# Patient Record
Sex: MALE | Race: BLACK OR AFRICAN AMERICAN | NOT HISPANIC OR LATINO
[De-identification: names, ages, dates, MRNs, and addresses within clinical notes are randomized per-mention and may not be internally consistent; named-entity substitution may affect disease eponyms.]

---

## 2017-09-21 ENCOUNTER — APPOINTMENT (OUTPATIENT)
Dept: UROLOGY | Facility: CLINIC | Age: 82
End: 2017-09-21

## 2017-10-04 ENCOUNTER — APPOINTMENT (OUTPATIENT)
Dept: UROLOGY | Facility: CLINIC | Age: 82
End: 2017-10-04
Payer: MEDICARE

## 2017-10-04 VITALS
WEIGHT: 140 LBS | OXYGEN SATURATION: 98 % | BODY MASS INDEX: 21.22 KG/M2 | HEIGHT: 68 IN | SYSTOLIC BLOOD PRESSURE: 120 MMHG | HEART RATE: 57 BPM | DIASTOLIC BLOOD PRESSURE: 62 MMHG

## 2017-10-04 DIAGNOSIS — Z80.42 FAMILY HISTORY OF MALIGNANT NEOPLASM OF PROSTATE: ICD-10-CM

## 2017-10-04 DIAGNOSIS — Z87.39 PERSONAL HISTORY OF OTHER DISEASES OF THE MUSCULOSKELETAL SYSTEM AND CONNECTIVE TISSUE: ICD-10-CM

## 2017-10-04 DIAGNOSIS — Z63.5 DISRUPTION OF FAMILY BY SEPARATION AND DIVORCE: ICD-10-CM

## 2017-10-04 DIAGNOSIS — Z87.891 PERSONAL HISTORY OF NICOTINE DEPENDENCE: ICD-10-CM

## 2017-10-04 DIAGNOSIS — M25.569 PAIN IN UNSPECIFIED KNEE: ICD-10-CM

## 2017-10-04 DIAGNOSIS — Z78.9 OTHER SPECIFIED HEALTH STATUS: ICD-10-CM

## 2017-10-04 PROCEDURE — 99204 OFFICE O/P NEW MOD 45 MIN: CPT

## 2017-10-04 RX ORDER — ECONAZOLE NITRATE 10 MG/G
CREAM TOPICAL
Refills: 0 | Status: ACTIVE | COMMUNITY

## 2017-10-04 RX ORDER — CICLOPIROX 80 MG/ML
SOLUTION TOPICAL
Refills: 0 | Status: ACTIVE | COMMUNITY

## 2017-10-04 RX ORDER — DICLOFENAC POTASSIUM 50 MG/1
TABLET, COATED ORAL
Refills: 0 | Status: ACTIVE | COMMUNITY

## 2017-10-04 RX ORDER — ESOMEPRAZOLE MAGNESIUM 40 MG/1
40 CAPSULE, DELAYED RELEASE ORAL
Refills: 0 | Status: ACTIVE | COMMUNITY

## 2017-10-04 SDOH — SOCIAL STABILITY - SOCIAL INSECURITY: DISRUPTION OF FAMILY BY SEPARATION AND DIVORCE: Z63.5

## 2017-10-05 LAB
APPEARANCE: ABNORMAL
BACTERIA: ABNORMAL
BILIRUBIN URINE: NEGATIVE
BLOOD URINE: ABNORMAL
COLOR: YELLOW
GLUCOSE QUALITATIVE U: NEGATIVE MG/DL
HYALINE CASTS: 4 /LPF
KETONES URINE: NEGATIVE
LEUKOCYTE ESTERASE URINE: ABNORMAL
MICROSCOPIC-UA: NORMAL
NITRITE URINE: POSITIVE
PH URINE: 5.5
PROTEIN URINE: NEGATIVE MG/DL
RED BLOOD CELLS URINE: 4 /HPF
SPECIFIC GRAVITY URINE: 1.02
SQUAMOUS EPITHELIAL CELLS: 14 /HPF
UROBILINOGEN URINE: NEGATIVE MG/DL
WHITE BLOOD CELLS URINE: 555 /HPF

## 2017-10-09 ENCOUNTER — OUTPATIENT (OUTPATIENT)
Dept: OUTPATIENT SERVICES | Facility: HOSPITAL | Age: 82
LOS: 1 days | End: 2017-10-09
Payer: MEDICARE

## 2017-10-09 ENCOUNTER — APPOINTMENT (OUTPATIENT)
Dept: MRI IMAGING | Facility: CLINIC | Age: 82
End: 2017-10-09
Payer: MEDICARE

## 2017-10-09 ENCOUNTER — MEDICATION RENEWAL (OUTPATIENT)
Age: 82
End: 2017-10-09

## 2017-10-09 ENCOUNTER — APPOINTMENT (OUTPATIENT)
Dept: CT IMAGING | Facility: CLINIC | Age: 82
End: 2017-10-09
Payer: MEDICARE

## 2017-10-09 DIAGNOSIS — Z00.8 ENCOUNTER FOR OTHER GENERAL EXAMINATION: ICD-10-CM

## 2017-10-09 LAB — BACTERIA UR CULT: ABNORMAL

## 2017-10-09 PROCEDURE — 72141 MRI NECK SPINE W/O DYE: CPT

## 2017-10-09 PROCEDURE — 74176 CT ABD & PELVIS W/O CONTRAST: CPT | Mod: 26

## 2017-10-09 PROCEDURE — 72141 MRI NECK SPINE W/O DYE: CPT | Mod: 26

## 2017-10-09 PROCEDURE — 74176 CT ABD & PELVIS W/O CONTRAST: CPT

## 2017-10-16 ENCOUNTER — OUTPATIENT (OUTPATIENT)
Dept: OUTPATIENT SERVICES | Facility: HOSPITAL | Age: 82
LOS: 1 days | End: 2017-10-16
Payer: MEDICARE

## 2017-10-16 ENCOUNTER — APPOINTMENT (OUTPATIENT)
Dept: MRI IMAGING | Facility: CLINIC | Age: 82
End: 2017-10-16
Payer: MEDICARE

## 2017-10-16 DIAGNOSIS — Z00.8 ENCOUNTER FOR OTHER GENERAL EXAMINATION: ICD-10-CM

## 2017-10-16 DIAGNOSIS — M54.12 RADICULOPATHY, CERVICAL REGION: ICD-10-CM

## 2017-10-16 PROCEDURE — 72156 MRI NECK SPINE W/O & W/DYE: CPT | Mod: 26

## 2017-10-16 PROCEDURE — 72156 MRI NECK SPINE W/O & W/DYE: CPT

## 2017-10-16 PROCEDURE — 72141 MRI NECK SPINE W/O DYE: CPT

## 2017-10-20 DIAGNOSIS — M50.322 OTHER CERVICAL DISC DEGENERATION AT C5-C6 LEVEL: ICD-10-CM

## 2017-10-20 DIAGNOSIS — M50.222 OTHER CERVICAL DISC DISPLACEMENT AT C5-C6 LEVEL: ICD-10-CM

## 2017-10-20 DIAGNOSIS — N28.1 CYST OF KIDNEY, ACQUIRED: ICD-10-CM

## 2017-10-20 DIAGNOSIS — M47.812 SPONDYLOSIS WITHOUT MYELOPATHY OR RADICULOPATHY, CERVICAL REGION: ICD-10-CM

## 2017-10-23 ENCOUNTER — APPOINTMENT (OUTPATIENT)
Dept: CT IMAGING | Facility: CLINIC | Age: 82
End: 2017-10-23
Payer: MEDICARE

## 2017-10-23 ENCOUNTER — OUTPATIENT (OUTPATIENT)
Dept: OUTPATIENT SERVICES | Facility: HOSPITAL | Age: 82
LOS: 1 days | End: 2017-10-23
Payer: MEDICARE

## 2017-10-23 DIAGNOSIS — R63.4 ABNORMAL WEIGHT LOSS: ICD-10-CM

## 2017-10-23 PROCEDURE — 71250 CT THORAX DX C-: CPT | Mod: 26

## 2017-10-23 PROCEDURE — 71250 CT THORAX DX C-: CPT

## 2017-11-13 ENCOUNTER — OUTPATIENT (OUTPATIENT)
Dept: OUTPATIENT SERVICES | Facility: HOSPITAL | Age: 82
LOS: 1 days | End: 2017-11-13
Payer: MEDICARE

## 2017-11-13 ENCOUNTER — APPOINTMENT (OUTPATIENT)
Dept: MRI IMAGING | Facility: CLINIC | Age: 82
End: 2017-11-13
Payer: MEDICARE

## 2017-11-13 DIAGNOSIS — Z00.8 ENCOUNTER FOR OTHER GENERAL EXAMINATION: ICD-10-CM

## 2017-11-13 PROCEDURE — 72148 MRI LUMBAR SPINE W/O DYE: CPT | Mod: 26

## 2017-11-13 PROCEDURE — 72148 MRI LUMBAR SPINE W/O DYE: CPT

## 2018-01-08 ENCOUNTER — APPOINTMENT (OUTPATIENT)
Dept: UROLOGY | Facility: CLINIC | Age: 83
End: 2018-01-08

## 2018-01-22 ENCOUNTER — RX RENEWAL (OUTPATIENT)
Age: 83
End: 2018-01-22

## 2018-03-22 ENCOUNTER — OUTPATIENT (OUTPATIENT)
Dept: OUTPATIENT SERVICES | Facility: HOSPITAL | Age: 83
LOS: 1 days | End: 2018-03-22
Payer: MEDICARE

## 2018-03-22 ENCOUNTER — APPOINTMENT (OUTPATIENT)
Dept: CT IMAGING | Facility: CLINIC | Age: 83
End: 2018-03-22
Payer: MEDICARE
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
## 2018-03-22 PROCEDURE — 70450 CT HEAD/BRAIN W/O DYE: CPT

## 2018-03-22 PROCEDURE — 70450 CT HEAD/BRAIN W/O DYE: CPT | Mod: 26

## 2018-03-26 ENCOUNTER — APPOINTMENT (OUTPATIENT)
Dept: NUCLEAR MEDICINE | Facility: IMAGING CENTER | Age: 83
End: 2018-03-26
Payer: MEDICARE

## 2018-03-26 ENCOUNTER — OUTPATIENT (OUTPATIENT)
Dept: OUTPATIENT SERVICES | Facility: HOSPITAL | Age: 83
LOS: 1 days | End: 2018-03-26
Payer: MEDICARE

## 2018-03-26 DIAGNOSIS — R93.8 ABNORMAL FINDINGS ON DIAGNOSTIC IMAGING OF OTHER SPECIFIED BODY STRUCTURES: ICD-10-CM

## 2018-03-26 DIAGNOSIS — M89.9 DISORDER OF BONE, UNSPECIFIED: ICD-10-CM

## 2018-03-26 PROCEDURE — 78999 UNLISTED MISC PX DX NUC MED: CPT

## 2018-03-26 PROCEDURE — 78306 BONE IMAGING WHOLE BODY: CPT

## 2018-03-26 PROCEDURE — 78306 BONE IMAGING WHOLE BODY: CPT | Mod: 26

## 2018-03-26 PROCEDURE — A9561: CPT

## 2018-03-26 PROCEDURE — 78320: CPT | Mod: 26

## 2018-04-23 ENCOUNTER — RX RENEWAL (OUTPATIENT)
Age: 83
End: 2018-04-23

## 2018-05-16 ENCOUNTER — APPOINTMENT (OUTPATIENT)
Dept: UROLOGY | Facility: CLINIC | Age: 83
End: 2018-05-16
Payer: MEDICARE

## 2018-05-16 VITALS
HEIGHT: 68 IN | TEMPERATURE: 97.9 F | SYSTOLIC BLOOD PRESSURE: 126 MMHG | OXYGEN SATURATION: 98 % | HEART RATE: 54 BPM | DIASTOLIC BLOOD PRESSURE: 64 MMHG

## 2018-05-16 DIAGNOSIS — Z86.79 PERSONAL HISTORY OF OTHER DISEASES OF THE CIRCULATORY SYSTEM: ICD-10-CM

## 2018-05-16 DIAGNOSIS — Z86.39 PERSONAL HISTORY OF OTHER ENDOCRINE, NUTRITIONAL AND METABOLIC DISEASE: ICD-10-CM

## 2018-05-16 PROCEDURE — 99214 OFFICE O/P EST MOD 30 MIN: CPT

## 2018-05-16 RX ORDER — LOSARTAN POTASSIUM 100 MG/1
100 TABLET, FILM COATED ORAL
Qty: 90 | Refills: 0 | Status: ACTIVE | COMMUNITY
Start: 2018-01-30

## 2018-05-16 RX ORDER — FLUTICASONE FUROATE AND VILANTEROL TRIFENATATE 200; 25 UG/1; UG/1
200-25 POWDER RESPIRATORY (INHALATION)
Qty: 60 | Refills: 0 | Status: DISCONTINUED | COMMUNITY
Start: 2018-01-23 | End: 2018-05-16

## 2018-05-16 RX ORDER — AMLODIPINE BESYLATE 5 MG/1
5 TABLET ORAL
Refills: 0 | Status: DISCONTINUED | COMMUNITY
End: 2018-05-16

## 2018-05-16 RX ORDER — FLUTICASONE FUROATE AND VILANTEROL TRIFENATATE 100; 25 UG/1; UG/1
100-25 POWDER RESPIRATORY (INHALATION)
Qty: 60 | Refills: 0 | Status: ACTIVE | COMMUNITY
Start: 2018-03-22

## 2018-05-16 RX ORDER — AMLODIPINE AND ATORVASTATIN 5; 10 MG/1; MG/1
5-10 TABLET, COATED ORAL
Qty: 90 | Refills: 0 | Status: ACTIVE | COMMUNITY
Start: 2018-03-07

## 2018-05-16 RX ORDER — UMECLIDINIUM 62.5 UG/1
62.5 AEROSOL, POWDER ORAL
Qty: 30 | Refills: 0 | Status: ACTIVE | COMMUNITY
Start: 2018-01-23

## 2018-05-18 LAB — BACTERIA UR CULT: ABNORMAL

## 2018-07-22 PROBLEM — Z78.9 ALCOHOL USE: Status: INACTIVE | Noted: 2017-10-04

## 2018-08-21 ENCOUNTER — RX RENEWAL (OUTPATIENT)
Age: 83
End: 2018-08-21

## 2018-11-19 ENCOUNTER — APPOINTMENT (OUTPATIENT)
Dept: UROLOGY | Facility: CLINIC | Age: 83
End: 2018-11-19
Payer: MEDICARE

## 2018-11-19 VITALS
WEIGHT: 140 LBS | HEIGHT: 68 IN | TEMPERATURE: 97.9 F | DIASTOLIC BLOOD PRESSURE: 72 MMHG | SYSTOLIC BLOOD PRESSURE: 112 MMHG | BODY MASS INDEX: 21.22 KG/M2 | OXYGEN SATURATION: 96 % | RESPIRATION RATE: 16 BRPM | HEART RATE: 79 BPM

## 2018-11-19 PROCEDURE — 99214 OFFICE O/P EST MOD 30 MIN: CPT

## 2018-11-19 RX ORDER — SOLIFENACIN SUCCINATE 5 MG/1
5 TABLET, FILM COATED ORAL DAILY
Qty: 30 | Refills: 6 | Status: DISCONTINUED | COMMUNITY
Start: 2018-05-16 | End: 2018-11-19

## 2018-11-23 LAB — BACTERIA UR CULT: ABNORMAL

## 2019-05-29 ENCOUNTER — APPOINTMENT (OUTPATIENT)
Dept: UROLOGY | Facility: CLINIC | Age: 84
End: 2019-05-29
Payer: MEDICARE

## 2019-05-29 VITALS
BODY MASS INDEX: 21.22 KG/M2 | WEIGHT: 140 LBS | RESPIRATION RATE: 16 BRPM | HEIGHT: 68 IN | SYSTOLIC BLOOD PRESSURE: 120 MMHG | DIASTOLIC BLOOD PRESSURE: 74 MMHG | HEART RATE: 84 BPM | OXYGEN SATURATION: 97 %

## 2019-05-29 PROCEDURE — 99213 OFFICE O/P EST LOW 20 MIN: CPT

## 2019-05-29 RX ORDER — BLOOD SUGAR DIAGNOSTIC
STRIP MISCELLANEOUS
Qty: 100 | Refills: 0 | Status: ACTIVE | COMMUNITY
Start: 2019-05-23

## 2019-05-29 RX ORDER — FLUCONAZOLE 100 MG/1
100 TABLET ORAL
Qty: 3 | Refills: 0 | Status: ACTIVE | COMMUNITY
Start: 2019-04-18

## 2019-05-29 RX ORDER — TOLTERODINE TARTRATE 4 MG/1
4 CAPSULE, EXTENDED RELEASE ORAL
Qty: 90 | Refills: 2 | Status: DISCONTINUED | COMMUNITY
Start: 2018-11-19 | End: 2019-05-29

## 2019-05-29 RX ORDER — OMEPRAZOLE 20 MG/1
20 CAPSULE, DELAYED RELEASE ORAL
Qty: 30 | Refills: 0 | Status: ACTIVE | COMMUNITY
Start: 2019-05-23

## 2019-05-29 RX ORDER — CHLORTHALIDONE 25 MG/1
25 TABLET ORAL
Qty: 90 | Refills: 0 | Status: ACTIVE | COMMUNITY
Start: 2018-12-03

## 2019-05-29 RX ORDER — LANCETS
EACH MISCELLANEOUS
Qty: 100 | Refills: 0 | Status: ACTIVE | COMMUNITY
Start: 2019-05-23

## 2019-05-29 NOTE — HISTORY OF PRESENT ILLNESS
[FreeTextEntry1] : He is an 83-year-old man who is seen today for persistent bacteriuria, urge incontinence and prostate cancer. Detrol LA did not change urgency. He is on Flomax and also prophylactic Macrobid. 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 In October 2018, PSA level was 0.5. He is to supposed to see primary care physician soon for repeat PSA level. Residual urine today was about 150 cc. There is no hematuria, or flank pain. \par Previous note: Patient states that his cardiologist gave him testosterone injections for low testosterone which he stopped now. He was treated with radiation therapy for prostate cancer about 10 years ago. Subsequently he had placement of a semirigid penile prosthesis. He has tried biofeedback and anticholinergic medications without success.

## 2019-05-29 NOTE — ASSESSMENT
[FreeTextEntry1] : Stop Detrol. Continue Flomax and prophylactic Macrobid. Urine culture will be repeated. He will continue to manage urinary symptoms and urge incontinence conservatively for now. He can followup in 6 months.\par \par Denzel Ramey MD, FACS\par The [facility]\par  of Urology\par \par [address], [address]\[place] [address]\par \par [address], [address]\[place] [address]\par \par Tel: [phone]\par Fax: [phone]

## 2019-05-29 NOTE — LETTER BODY
[Dear  ___] : Dear  [unfilled], [Consult Closing:] : Thank you very much for allowing me to participate in the care of this patient.  If you have any questions, please do not hesitate to contact me. [Consult Letter:] : I had the pleasure of evaluating your patient, [unfilled]. [FreeTextEntry1] : ACP\par [address] \[place], [place], [address]  \par [phone] \par

## 2019-05-29 NOTE — PHYSICAL EXAM
[General Appearance - Well Nourished] : well nourished [General Appearance - Well Developed] : well developed [Well Groomed] : well groomed [Normal Appearance] : normal appearance [General Appearance - In No Acute Distress] : no acute distress [Abdomen Tenderness] : non-tender [Abdomen Soft] : soft [Costovertebral Angle Tenderness] : no ~M costovertebral angle tenderness [Urethral Meatus] : meatus normal [Penis Abnormality] : normal uncircumcised penis [Testes Tenderness] : no tenderness of the testes [Urinary Bladder Findings] : the bladder was normal on palpation [Scrotum] : the scrotum was normal [FreeTextEntry1] : IPP palpated, CHRIS done in 2017, small and soft testes [] : no respiratory distress [Testes Mass (___cm)] : there were no testicular masses [Exaggerated Use Of Accessory Muscles For Inspiration] : no accessory muscle use [Respiration, Rhythm And Depth] : normal respiratory rhythm and effort

## 2019-05-31 LAB — BACTERIA UR CULT: NORMAL

## 2019-06-12 ENCOUNTER — MEDICATION RENEWAL (OUTPATIENT)
Age: 84
End: 2019-06-12

## 2019-12-11 ENCOUNTER — APPOINTMENT (OUTPATIENT)
Dept: UROLOGY | Facility: CLINIC | Age: 84
End: 2019-12-11
Payer: MEDICARE

## 2019-12-11 VITALS
BODY MASS INDEX: 20.16 KG/M2 | OXYGEN SATURATION: 98 % | DIASTOLIC BLOOD PRESSURE: 70 MMHG | HEART RATE: 68 BPM | RESPIRATION RATE: 13 BRPM | WEIGHT: 133 LBS | HEIGHT: 68 IN | SYSTOLIC BLOOD PRESSURE: 122 MMHG

## 2019-12-11 PROCEDURE — 99214 OFFICE O/P EST MOD 30 MIN: CPT

## 2019-12-11 RX ORDER — NITROFURANTOIN (MONOHYDRATE/MACROCRYSTALS) 25; 75 MG/1; MG/1
100 CAPSULE ORAL
Qty: 90 | Refills: 3 | Status: DISCONTINUED | COMMUNITY
Start: 2017-10-09 | End: 2019-12-11

## 2019-12-11 NOTE — ASSESSMENT
[FreeTextEntry1] : Urine culture will be repeated. Most likely he still has bacteriuria which will be observed. Macrobid was stopped by another physician. Residual urine was discussed. He could try Flomax twice a day for now. If not effective, we could consider Myrbetriq. PSA level will be checked. He will followup in 6 months.\par \par Denzel Ramey MD, FACS\par [facility]\par  of Urology\par \par [address], [address]\[place] [address]\par \par [address], [address]\[place] [address]\par \par Tel: [phone]\par Fax: [phone]

## 2019-12-11 NOTE — HISTORY OF PRESENT ILLNESS
[FreeTextEntry1] : He is an 83-year-old man who is seen today for persistent bacteriuria, urge incontinence and prostate cancer. 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 In October 2018, PSA level was 0.5. According to the patient, recently he fell and went to rehabilitation. He is using a walker. He continues to have urge incontinence. He did not respond to Detrol LA. Also prophylactic Macrobid was stopped, according to the patient. He does not have dysuria at this time. Last urine culture was contaminated in May 2019. Residual urine today was 250 cc. He is on Flomax. He is using multiple sanitary pads in one day.\par \par Previous note: Patient states that his cardiologist gave him testosterone injections for low testosterone which he stopped now. He was treated with radiation therapy for prostate cancer about 10 years ago. Subsequently he had placement of a semirigid penile prosthesis. He has tried biofeedback and anticholinergic medications without success.

## 2019-12-11 NOTE — PHYSICAL EXAM
[General Appearance - Well Developed] : well developed [General Appearance - Well Nourished] : well nourished [Normal Appearance] : normal appearance [Well Groomed] : well groomed [Abdomen Soft] : soft [General Appearance - In No Acute Distress] : no acute distress [Abdomen Tenderness] : non-tender [Costovertebral Angle Tenderness] : no ~M costovertebral angle tenderness [Penis Abnormality] : normal uncircumcised penis [Urethral Meatus] : meatus normal [Urinary Bladder Findings] : the bladder was normal on palpation [Scrotum] : the scrotum was normal [Testes Tenderness] : no tenderness of the testes [Testes Mass (___cm)] : there were no testicular masses [] : no respiratory distress [Respiration, Rhythm And Depth] : normal respiratory rhythm and effort [Exaggerated Use Of Accessory Muscles For Inspiration] : no accessory muscle use [Affect] : the affect was normal [Not Anxious] : not anxious [FreeTextEntry1] : using a walker.

## 2019-12-11 NOTE — LETTER BODY
[Dear  ___] : Dear  [unfilled], [Consult Letter:] : I had the pleasure of evaluating your patient, [unfilled]. [Consult Closing:] : Thank you very much for allowing me to participate in the care of this patient.  If you have any questions, please do not hesitate to contact me. [FreeTextEntry1] : ACP\par [address] \[place], [address]  \par [phone] \par

## 2019-12-12 LAB — PSA SERPL-MCNC: 0.03 NG/ML

## 2019-12-15 LAB — BACTERIA UR CULT: ABNORMAL

## 2020-06-22 ENCOUNTER — APPOINTMENT (OUTPATIENT)
Dept: UROLOGY | Facility: CLINIC | Age: 85
End: 2020-06-22
Payer: MEDICARE

## 2020-07-02 ENCOUNTER — APPOINTMENT (OUTPATIENT)
Dept: UROLOGY | Facility: CLINIC | Age: 85
End: 2020-07-02
Payer: MEDICARE

## 2020-07-02 VITALS
TEMPERATURE: 98.2 F | OXYGEN SATURATION: 98 % | WEIGHT: 133 LBS | BODY MASS INDEX: 20.16 KG/M2 | HEIGHT: 68 IN | HEART RATE: 77 BPM | SYSTOLIC BLOOD PRESSURE: 150 MMHG | DIASTOLIC BLOOD PRESSURE: 81 MMHG | RESPIRATION RATE: 16 BRPM

## 2020-07-02 PROCEDURE — 99213 OFFICE O/P EST LOW 20 MIN: CPT

## 2020-07-02 RX ORDER — TAMSULOSIN HYDROCHLORIDE 0.4 MG/1
0.4 CAPSULE ORAL
Qty: 180 | Refills: 3 | Status: DISCONTINUED | COMMUNITY
Start: 2018-04-02 | End: 2020-07-02

## 2020-07-02 NOTE — HISTORY OF PRESENT ILLNESS
[FreeTextEntry1] : He is an 84-year-old man who is seen today for persistent bacteriuria, urge incontinence and prostate cancer. Flomax and Detrol LA did not change urge incontinence. He still uses 4-6 pads a day. He has difficulty making it to the bathroom on time for a long time. He uses a walker. Also he has tried long-term Macrobid. In December 2019, PSA level was 0.03 and urine cultures showed Proteus which was observed since he is asymptomatic. Previously he had ESBL ESCHERICHIA coli in urine. Residual urine today was 144 cc. He uses a hernia belt. He also has history of urethral strictures.\par \par Previous note: Patient states that his cardiologist gave him testosterone injections for low testosterone which he stopped now. He was treated with radiation therapy for prostate cancer about 10 years ago. Subsequently he had placement of a semirigid penile prosthesis. He has tried biofeedback and anticholinergic medications without success.

## 2020-07-02 NOTE — ASSESSMENT
[FreeTextEntry1] : We can try Uroxatral instead of Flomax. I doubt incontinence will change. Bowel regimen was discussed. If Uroxatral does not work, we will stop using medications for his urinary symptoms. Chronic bacteriuria will continue to be monitored for now. Residual urine volume improved. PSA level was very low recently. He can followup in 6 months.\par \par Denzel Ramey MD, FACS\par [facility]\par  of Urology\par \par [address], [address]\[place] [address]\par \par [address], [address]\[place] [address]\par \par Tel: [phone]\par Fax: [phone]

## 2020-07-02 NOTE — PHYSICAL EXAM
[General Appearance - Well Developed] : well developed [Normal Appearance] : normal appearance [Well Groomed] : well groomed [Abdomen Soft] : soft [General Appearance - In No Acute Distress] : no acute distress [Abdomen Tenderness] : non-tender [Costovertebral Angle Tenderness] : no ~M costovertebral angle tenderness [Penis Abnormality] : normal uncircumcised penis [Urethral Meatus] : meatus normal [Urinary Bladder Findings] : the bladder was normal on palpation [Scrotum] : the scrotum was normal [Testes Tenderness] : no tenderness of the testes [Testes Mass (___cm)] : there were no testicular masses [] : no respiratory distress [Respiration, Rhythm And Depth] : normal respiratory rhythm and effort [Exaggerated Use Of Accessory Muscles For Inspiration] : no accessory muscle use [FreeTextEntry1] : using a walker

## 2020-07-02 NOTE — LETTER BODY
[Dear  ___] : Dear  [unfilled], [Consult Letter:] : I had the pleasure of evaluating your patient, [unfilled]. [FreeTextEntry1] : ACP\par [address] \[place], [address]  \par [phone] \par  [Consult Closing:] : Thank you very much for allowing me to participate in the care of this patient.  If you have any questions, please do not hesitate to contact me.

## 2020-07-05 LAB — BACTERIA UR CULT: ABNORMAL

## 2020-11-02 ENCOUNTER — APPOINTMENT (OUTPATIENT)
Dept: UROLOGY | Facility: CLINIC | Age: 85
End: 2020-11-02
Payer: MEDICARE

## 2020-11-02 VITALS
TEMPERATURE: 98.4 F | HEART RATE: 84 BPM | HEIGHT: 68 IN | BODY MASS INDEX: 20.16 KG/M2 | OXYGEN SATURATION: 98 % | WEIGHT: 133 LBS | SYSTOLIC BLOOD PRESSURE: 156 MMHG | RESPIRATION RATE: 14 BRPM | DIASTOLIC BLOOD PRESSURE: 82 MMHG

## 2020-11-02 PROCEDURE — 52000 CYSTOURETHROSCOPY: CPT

## 2020-11-16 ENCOUNTER — OUTPATIENT (OUTPATIENT)
Dept: OUTPATIENT SERVICES | Facility: HOSPITAL | Age: 85
LOS: 1 days | End: 2020-11-16
Payer: MEDICARE

## 2020-11-16 ENCOUNTER — NON-APPOINTMENT (OUTPATIENT)
Age: 85
End: 2020-11-16

## 2020-11-16 ENCOUNTER — APPOINTMENT (OUTPATIENT)
Dept: ULTRASOUND IMAGING | Facility: CLINIC | Age: 85
End: 2020-11-16
Payer: MEDICARE

## 2020-11-16 DIAGNOSIS — N39.0 URINARY TRACT INFECTION, SITE NOT SPECIFIED: ICD-10-CM

## 2020-11-16 PROCEDURE — 76775 US EXAM ABDO BACK WALL LIM: CPT | Mod: 26

## 2020-11-16 PROCEDURE — 76770 US EXAM ABDO BACK WALL COMP: CPT

## 2020-11-16 PROCEDURE — 76770 US EXAM ABDO BACK WALL COMP: CPT | Mod: 26

## 2020-11-16 PROCEDURE — 76775 US EXAM ABDO BACK WALL LIM: CPT

## 2021-01-04 ENCOUNTER — RX RENEWAL (OUTPATIENT)
Age: 86
End: 2021-01-04

## 2021-01-07 ENCOUNTER — APPOINTMENT (OUTPATIENT)
Dept: UROLOGY | Facility: CLINIC | Age: 86
End: 2021-01-07

## 2021-04-28 ENCOUNTER — APPOINTMENT (OUTPATIENT)
Dept: UROLOGY | Facility: CLINIC | Age: 86
End: 2021-04-28
Payer: MEDICARE

## 2021-04-28 PROCEDURE — 99214 OFFICE O/P EST MOD 30 MIN: CPT

## 2021-04-28 NOTE — ASSESSMENT
[FreeTextEntry1] : Residual urine volume is stable.  PSA level will be checked in follow-up for prostate cancer.  He is on Uroxatrol.  He is not interested at this time to undergo incision of urethral strictures or consider bladder Botox injection.  Pending insurance approval, Myrbetriq will be prescribed for him.  Side effects discussed.  He is asymptomatic with chronic bacteriuria which should continue to be observed.  Follow-up in 4 to 6 months.\par \par Denzel Ramey MD, FACS\par [facility]  of Urology\[facility] \[facility] [address]\[place] [address]\par \par [address], [address]\[place] [address]\par \[facility] Tel: [phone]\par Fax: [phone]

## 2021-04-28 NOTE — LETTER BODY
[Dear  ___] : Dear  [unfilled], [Consult Letter:] : I had the pleasure of evaluating your patient, [unfilled]. [Consult Closing:] : Thank you very much for allowing me to participate in the care of this patient.  If you have any questions, please do not hesitate to contact me. [FreeTextEntry1] : ACP\par [address] \[place], [address]  \par [phone] \par

## 2021-04-28 NOTE — PHYSICAL EXAM
[General Appearance - Well Developed] : well developed [General Appearance - Well Nourished] : well nourished [Normal Appearance] : normal appearance [Well Groomed] : well groomed [General Appearance - In No Acute Distress] : no acute distress [Abdomen Soft] : soft [Abdomen Tenderness] : non-tender [Costovertebral Angle Tenderness] : no ~M costovertebral angle tenderness [Urethral Meatus] : meatus normal [Penis Abnormality] : normal uncircumcised penis [Urinary Bladder Findings] : the bladder was normal on palpation [Scrotum] : the scrotum was normal [Testes Tenderness] : no tenderness of the testes [Testes Mass (___cm)] : there were no testicular masses [FreeTextEntry1] : Penile prosthesis noted, soft testes.

## 2021-04-28 NOTE — HISTORY OF PRESENT ILLNESS
[FreeTextEntry1] : He is an 85-year-old man who is seen today for persistent bacteriuria, urge incontinence and prostate cancer.  In November 2020, cystoscopy showed tight urethral stricture and the bladder could not be evaluated.  He decided to not proceed with procedure under anesthesia as far as incision and dilation of strictures.  Ultrasound showed normal kidneys.  He has chronic bacteriuria and the last culture in July 2020 showed Proteus.  His main complaint is urge incontinence.  He is on Uroxatrol.  Residual urine today was stable, 160 mL.  \par \par Flomax and Detrol LA did not change urge incontinence. He uses 4-6 pads a day. He has difficulty making it to the bathroom on time for a long time. He uses a walker. Also he has tried long-term Macrobid. In December 2019, PSA level was 0.03 and urine cultures showed Proteus which was observed since he is asymptomatic. Previously he had ESBL ESCHERICHIA coli in urine. He uses a hernia belt. He also has history of urethral strictures.\par \par Previous note: Patient states that his cardiologist gave him testosterone injections for low testosterone which he stopped now. He was treated with radiation therapy for prostate cancer about 10 years ago. Subsequently he had placement of a semirigid penile prosthesis. He has tried biofeedback and anticholinergic medications without success.

## 2021-04-29 LAB — PSA SERPL-MCNC: <0.01 NG/ML

## 2021-08-05 ENCOUNTER — APPOINTMENT (OUTPATIENT)
Dept: UROLOGY | Facility: CLINIC | Age: 86
End: 2021-08-05
Payer: MEDICARE

## 2021-08-05 VITALS
HEART RATE: 64 BPM | WEIGHT: 133 LBS | OXYGEN SATURATION: 95 % | HEIGHT: 68 IN | DIASTOLIC BLOOD PRESSURE: 82 MMHG | TEMPERATURE: 97.8 F | BODY MASS INDEX: 20.16 KG/M2 | RESPIRATION RATE: 15 BRPM | SYSTOLIC BLOOD PRESSURE: 167 MMHG

## 2021-08-05 PROCEDURE — 99213 OFFICE O/P EST LOW 20 MIN: CPT

## 2021-08-05 RX ORDER — MIRABEGRON 50 MG/1
50 TABLET, FILM COATED, EXTENDED RELEASE ORAL
Qty: 30 | Refills: 6 | Status: DISCONTINUED | COMMUNITY
Start: 2021-04-28 | End: 2021-08-05

## 2021-08-05 NOTE — PHYSICAL EXAM
[General Appearance - Well Developed] : well developed [General Appearance - Well Nourished] : well nourished [Normal Appearance] : normal appearance [Well Groomed] : well groomed [General Appearance - In No Acute Distress] : no acute distress [Abdomen Soft] : soft [Abdomen Tenderness] : non-tender [Costovertebral Angle Tenderness] : no ~M costovertebral angle tenderness [Urethral Meatus] : meatus normal [Penis Abnormality] : normal uncircumcised penis [Urinary Bladder Findings] : the bladder was normal on palpation [Scrotum] : the scrotum was normal [Testes Tenderness] : no tenderness of the testes [Testes Mass (___cm)] : there were no testicular masses [FreeTextEntry1] : Using a walker

## 2021-08-05 NOTE — HISTORY OF PRESENT ILLNESS
[FreeTextEntry1] : He is an 85-year-old man who is seen today for persistent bacteriuria, urge incontinence and prostate cancer.  Addition of Myrbetriq was not helpful for urge incontinence.  Recently he noticed blood in the urine which stopped spontaneously.  Cystoscopy previously was not feasible due to urethral stricture.  He decided on observation.  Residual urine today was 150 cc.  PSA level was undetectable in April 2021.\par In November 2020, cystoscopy showed tight urethral stricture and the bladder could not be evaluated. Ultrasound showed normal kidneys.  He has chronic bacteriuria and the culture in July 2020 showed Proteus.  He is on Uroxatrol. \par \par Previous notes: Flomax and Detrol LA did not change urge incontinence. He uses 4-6 pads a day. He has difficulty making it to the bathroom on time for a long time. He uses a walker. Also he has tried long-term Macrobid. Previously he had ESBL ESCHERICHIA coli in urine. He uses a hernia belt. \par \par Patient states that his cardiologist gave him testosterone injections for low testosterone which he stopped now. He was treated with radiation therapy for prostate cancer about 10 years ago. Subsequently he had placement of a semirigid penile prosthesis. He has tried biofeedback and anticholinergic medications without success.

## 2021-08-05 NOTE — LETTER BODY
[Dear  ___] : Dear  [unfilled], [Consult Letter:] : I had the pleasure of evaluating your patient, [unfilled]. [Consult Closing:] : Thank you very much for allowing me to participate in the care of this patient.  If you have any questions, please do not hesitate to contact me. [FreeTextEntry1] : ACP\par [address] \[place], [place], [address]  \par [phone] \par

## 2021-08-05 NOTE — ASSESSMENT
[FreeTextEntry1] : Work-up of hematuria and potential causes of hematuria were discussed.  Cystoscopy was not possible due to urethral stricture.  He decided not to proceed with cystoscopy under anesthesia and incision of urethral strictures.  PSA level was undetectable.  Pending results, he will follow up in a few months.  We will discuss the results on the phone.  Stop Myrbetriq.  Continue alfuzosin.\par \par Denzel Ramey MD, FACS\par [facility]\par  of Urology\par \par [address]\[place] [address]\par \par [address], [address]\[place] [address]\par \par Tel: [phone]\par Fax: [phone]

## 2021-08-09 LAB
BACTERIA UR CULT: ABNORMAL
URINE CYTOLOGY: NORMAL

## 2021-11-10 ENCOUNTER — APPOINTMENT (OUTPATIENT)
Dept: UROLOGY | Facility: CLINIC | Age: 86
End: 2021-11-10
Payer: MEDICARE

## 2021-11-10 VITALS
BODY MASS INDEX: 20.16 KG/M2 | WEIGHT: 133 LBS | HEIGHT: 68 IN | SYSTOLIC BLOOD PRESSURE: 129 MMHG | HEART RATE: 76 BPM | DIASTOLIC BLOOD PRESSURE: 77 MMHG | RESPIRATION RATE: 14 BRPM | OXYGEN SATURATION: 96 % | TEMPERATURE: 97.3 F

## 2021-11-10 PROCEDURE — 99214 OFFICE O/P EST MOD 30 MIN: CPT

## 2021-11-12 LAB — BACTERIA UR CULT: NORMAL

## 2021-12-12 NOTE — LETTER BODY
[Dear  ___] : Dear  [unfilled], [Consult Letter:] : I had the pleasure of evaluating your patient, [unfilled]. [Consult Closing:] : Thank you very much for allowing me to participate in the care of this patient.  If you have any questions, please do not hesitate to contact me. [FreeTextEntry1] : ACP\par [address] \[place], [address]  \par [phone] \par

## 2021-12-12 NOTE — ASSESSMENT
[FreeTextEntry1] : Bacteriuria is chronic and generally observed in his situation.  However recently he had gross hematuria.  He did not seem to have any other symptoms.  He is on Cipro at this moment.  Continue with alfuzosin and Myrbetriq.  Due to recurrent gross hematuria, he should consider cystoscopy under anesthesia with possible urethrotomy and bladder biopsy.  Causes of hematuria such as infections, malignancy and radiation cystitis or prostatitis were discussed.  He agrees to proceed with the procedure.  We will try to obtain his results from the hospital admission.\par \par Addendum: Patient contacted the office subsequently and informed us that he does not want to proceed with the above-mentioned procedure at this time.\par \par Denzel Ramey MD, FACS\par [facility]\par  of Urology\par [address] [place] [address], [address]\[place] [address]\par Tel: [phone]\par Fax: [phone]\par

## 2021-12-12 NOTE — PHYSICAL EXAM
[General Appearance - Well Developed] : well developed [General Appearance - Well Nourished] : well nourished [Normal Appearance] : normal appearance [Well Groomed] : well groomed [General Appearance - In No Acute Distress] : no acute distress [Abdomen Soft] : soft [Abdomen Tenderness] : non-tender [Costovertebral Angle Tenderness] : no ~M costovertebral angle tenderness [] : no respiratory distress [Respiration, Rhythm And Depth] : normal respiratory rhythm and effort [Exaggerated Use Of Accessory Muscles For Inspiration] : no accessory muscle use [Affect] : the affect was normal [Mood] : the mood was normal [FreeTextEntry1] : Using a walker

## 2021-12-12 NOTE — HISTORY OF PRESENT ILLNESS
[FreeTextEntry1] : He is an 85-year-old man who is seen today for persistent bacteriuria, urge incontinence and prostate cancer.  Recently he was hospitalized for gross hematuria and was admitted for 6 days to hospital.  He was told to have urinary tract infection and treated with antibiotics, initially with ceftriaxone and then Cipro.  Hematuria has stopped.  There is no fever or flank pain.  He has no dysuria.  Residual urine today was 130 cc.  Myrbetriq helped with urge incontinence.  Also he is on alfuzosin.  Urine cytology was negative and urine culture showed Proteus in August 2021.\par \par Addendum: Review of his records from the hospital showed that in October 2021 he was admitted for dysuria, hematuria and chest pain.  White blood cell count was 3.5 and hematocrit 32.  Creatinine was 1.0.  Urinalysis showed Proteus and Corynebacterium species.  He was treated with antibiotics.  CT scan with IV contrast showed mild to moderate bilateral hydronephrosis, right 1 cm renal cyst and thickened bladder wall.\par \par Cystoscopy previously was not feasible due to urethral stricture.  He decided on observation. PSA level was undetectable in April 2021.\par In November 2020, cystoscopy showed tight urethral stricture and the bladder could not be evaluated. Ultrasound showed normal kidneys.\par \par Previous notes: Flomax and Detrol LA did not change urge incontinence. He used 4-6 pads a day. He has difficulty making it to the bathroom on time for a long time. He uses a walker. Also he has tried long-term Macrobid. Previously he had ESBL ESCHERICHIA coli in urine. He uses a hernia belt. \par \par Patient states that his cardiologist gave him testosterone injections for low testosterone which he stopped now. He was treated with radiation therapy for prostate cancer about 10 years ago. Subsequently he had placement of a semirigid penile prosthesis. He has tried biofeedback and anticholinergic medications without success.

## 2021-12-14 ENCOUNTER — APPOINTMENT (OUTPATIENT)
Dept: UROLOGY | Facility: HOSPITAL | Age: 86
End: 2021-12-14

## 2022-03-09 ENCOUNTER — APPOINTMENT (OUTPATIENT)
Dept: UROLOGY | Facility: CLINIC | Age: 87
End: 2022-03-09

## 2022-07-13 ENCOUNTER — APPOINTMENT (OUTPATIENT)
Dept: ORTHOPEDIC SURGERY | Facility: CLINIC | Age: 87
End: 2022-07-13

## 2022-07-13 DIAGNOSIS — M47.812 SPONDYLOSIS W/OUT MYELOPATHY OR RADICULOPATHY, CERVICAL REGION: ICD-10-CM

## 2022-07-13 PROCEDURE — 99204 OFFICE O/P NEW MOD 45 MIN: CPT

## 2022-07-13 RX ORDER — AMLODIPINE BESYLATE 10 MG/1
10 TABLET ORAL
Qty: 90 | Refills: 0 | Status: ACTIVE | COMMUNITY
Start: 2022-06-29

## 2022-07-13 RX ORDER — ATORVASTATIN CALCIUM 10 MG/1
10 TABLET, FILM COATED ORAL
Qty: 90 | Refills: 0 | Status: ACTIVE | COMMUNITY
Start: 2022-01-04

## 2022-07-13 NOTE — HISTORY OF PRESENT ILLNESS
[de-identified] : Patient is here for left shoulder pain that began about a month ago. There was no inciting trauma. He has been in PT and he feels it started with the arm bike. He was seen by his PCP who sent him for xrays. He is taking Tylenol for pain relief. \par \par The patient's past medical history, past surgical history, medications and allergies were reviewed by me today and documented accordingly. In addition, the patient's family and social history, which were noncontributory to this visit, were reviewed also. Intake form was reviewed. The patient has no family history of arthritis.

## 2022-07-13 NOTE — CONSULT LETTER
[Dear  ___] : Dear  [unfilled], [Consult Letter:] : I had the pleasure of evaluating your patient, [unfilled]. [Please see my note below.] : Please see my note below. [Consult Closing:] : Thank you very much for allowing me to participate in the care of this patient.  If you have any questions, please do not hesitate to contact me. [Sincerely,] : Sincerely, [FreeTextEntry2] : [address]\par [facility] [address] [FreeTextEntry3] : Abel Luna DO, ATC\par Primary Care Sports Medicine\par [facility] [place]

## 2022-07-13 NOTE — PHYSICAL EXAM
[de-identified] : Constitutional: Well-nourished, well-developed, No acute distress\par Respiratory:  Good respiratory effort, no SOB\par Psychiatric: Pleasant and normal affect, alert and oriented x3\par Musculoskeletal: normal except where as noted in regional exam\par \par Cervical Spine Exam\par APPEARANCE: Poor posture, exaggerated cervical lordosis, with thoracic kyphosis\par POSITIVE TENDERNESS: + Left upper trapezius, levator scapula, rhomboid major, and rhomboid minor, + spasm noted in the same muscles.\par NONTENDER: no bony midline tenderness.\par ROM: Significantly limited in all planes, most notably in flexion and sidebending due to pain\par RESISTIVE TESTING: painful 4/5 resisted ext, bilateral sidebending, rotation and shoulder shrug , 5/5 flexion \par SPECIAL TESTS: neg Spurling's b/l\par Vasc: 2+ radial pulse b/l\par Neuro: C5 - T1 intact to motor, DTRs 2+/4 biceps, triceps, brachioradialis\par Sensation: Intact to light touch throughout b/l UE\par \par Thoracic Spine Exam:\par \par normal curvature and normal alignment. good posture. no midline bony tenderness, + marked spasm and associated tenderness of left paraspinal and periscapular muscles.  ROM mildly limited in sidebending and rotation due to noted spasm\par \par Left shoulder:\par APPEARANCE: no marked deformities, no swelling or malalignment\par POSITIVE TENDERNESS: supraspinatus\par NONTENDER:  infraspinatus, teres minor. biceps. anterior and posterior capsule. AC joint. \par ROM: full with mild painful arc past 60 degrees, no scapular winging or dyskinesia present\par RESISTIVE TESTING: MMT 4+/5 ER and empty can, 5/5 IR. painless 5/5 resisted flex/ext, horizontal abd/add \par SPECIAL TESTS: + Ruvalcaba and Neers, mildly + cross arm adduction, neg Speeds, neg Finnegan's, neg Drop Arm, neg Apprehension. neg apley's scratch test\par  [de-identified] : I reviewed, interpreted and clinically correlated the following outside imaging studies,\par [facility]\par X-rays, 3 views of the left shoulder, evidence of mild glenohumeral osteoarthritis\par X-rays, 2 views of the cervical spine, evidence of severe multilevel osteoarthritis with exaggerated cervical lordosis and significant spondylolisthesis at multiple levels\par \par Date of Exam: 07-\par  \par EXAM:  X-RAY LEFT SHOULDER MINIMUM 2 VIEWS\par \par HISTORY:  Left shoulder pain.\par \par TECHNIQUE:  3 radiographic views of the left shoulder were obtained. \par \par FINDINGS:  \par \par Osseous structures: There is no fracture or dislocation. Bone mineralization appears normal.\par \par Joints: Glenohumeral joint space is preserved. The left acromioclavicular joint demonstrates mild degenerative changes.\par \par Soft tissues: No soft tissue calcification or radiopaque foreign body.\par \par The visualized left hemithorax is clear.\par \par IMPRESSION: Mild degenerative changes left acromioclavicular joint.\par \par 	\par EXAM:  X-RAY CERVICAL SPINE 2 OR 3 VIEWS\par \par HISTORY:  Cervical neck pain.\par \par TECHNIQUE:  2 radiographic views of the cervical spine were obtained.\par \par FINDINGS:  \par \par Vertebrae: Osteopenia. Cervical vertebral body heights are maintained without evidence of compression fracture.\par \par Mild retrolisthesis C3-4 and C5-6. 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 Mild anterolisthesis C4-5.\par \par 	\par EXAM:  X-RAY LEFT HUMERUS MINIMUM 2 VIEWS\par \par HISTORY:  Left arm pain.\par \par TECHNIQUE: 3 radiographic views of the left humerus were obtained. \par \par FINDINGS:  \par \par Osseous structures: There is no visible fracture or post fracture deformity. Osteopenia.\par \par Joints: Mild degenerative changes left shoulder and left elbow.\par \par Soft tissues: Unremarkable.\par \par IMPRESSION: No acute osseous abnormality.\par

## 2022-07-13 NOTE — DISCUSSION/SUMMARY
[de-identified] : Discussed findings of today's exam and possible causes of patient's pain.  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  Reviewed possible courses of treatment, and we collaboratively decided best course of treatment at this time will include conservative management.  The patient's main area of pain is in the cervicothoracic and periscapular regions, while he does have some mild subacromial impingement, I feel this is secondary to his other issues and that the primary cause of his pain.  The patient is inquiring about an injection, he is advised that shoulder injection is of likely limited benefit.  At this time recommend physiatry consultation to discuss risk/benefits of epidural/facet steroid injections, or potentially myofascial trigger point injections.  Patient will continue his course of physical therapy to help alleviate this chronic myofascial pain.  Follow up as needed.  Patient appreciates and agrees with current plan.\par \par I work as part of an academic orthopedic group and routinely have a physician in training (resident / fellow) working with me.  Any part of the history and physical exam performed by the physician in training was either directly reviewed and/or replicated by myself.  Any procedure performed by the physician in training was performed under my direct supervision and with the consent of the patient.\par \par This note was generated using dragon medical dictation software.  A reasonable effort has been made for proofreading its contents, but typos may still remain.  If there are any questions or points of clarification needed please notify my office.\par

## 2023-05-31 ENCOUNTER — APPOINTMENT (OUTPATIENT)
Dept: UROLOGY | Facility: CLINIC | Age: 88
End: 2023-05-31
Payer: MEDICARE

## 2023-05-31 PROCEDURE — 99214 OFFICE O/P EST MOD 30 MIN: CPT

## 2023-05-31 RX ORDER — ALFUZOSIN HYDROCHLORIDE 10 MG/1
10 TABLET, EXTENDED RELEASE ORAL
Qty: 90 | Refills: 1 | Status: ACTIVE | COMMUNITY
Start: 2020-07-02 | End: 1900-01-01

## 2023-05-31 RX ORDER — MIRABEGRON 50 MG/1
50 TABLET, FILM COATED, EXTENDED RELEASE ORAL
Qty: 90 | Refills: 2 | Status: ACTIVE | COMMUNITY
Start: 2023-05-31 | End: 1900-01-01

## 2023-05-31 NOTE — HISTORY OF PRESENT ILLNESS
[FreeTextEntry1] : He is an 87 year-old man who is seen today for persistent bacteriuria, urge incontinence and prostate cancer who was last seen in 2021.  He believes that the urge incontinence has worsened.  He is not sure if he is still taking alfuzosin and Myrbetriq which were helping him with urinary symptoms.  On and off he has noticed blood in his urine.  He was hospitalized around 2021 for gross hematuria and was told to have a urinary tract infection.  Residual urine volume today was unchanged which is about 125 mL.  Previously urine cytology was negative and culture showed Proteus.  Cystoscopy was attempted in the office but due to tight urethral stricture the bladder could not be evaluated.  He declined to proceed with urethral stricture dilation under anesthesia.  PSA level was undetectable in 2021.  He uses several sanitary pads a day for incontinence.\par \par Addendum: Review of his records from the hospital showed that in October 2021 he was admitted for dysuria, hematuria and chest pain.  White blood cell count was 3.5 and hematocrit 32.  Creatinine was 1.0.  Urinalysis showed Proteus and Corynebacterium species.  He was treated with antibiotics.  CT scan with IV contrast showed mild to moderate bilateral hydronephrosis, right 1 cm renal cyst and thickened bladder wall.  In 2020, ultrasound showed normal kidneys.\par \par Previous notes: Flomax and Detrol LA did not change urge incontinence.  He has difficulty making it to the bathroom on time for a long time. He uses a walker. Also he has tried long-term Macrobid. Previously he had ESBL ESCHERICHIA coli in urine. He uses a hernia belt. \par \par Patient states that his cardiologist gave him testosterone injections for low testosterone in the past. He was treated with radiation therapy for prostate cancer more than 10 years ago. Subsequently he had placement of a semirigid penile prosthesis. He has tried biofeedback and anticholinergic medications without success.

## 2023-05-31 NOTE — ASSESSMENT
[FreeTextEntry1] : He was unable to give us a urine sample today.  Unfortunately he has declined to proceed with dilation of urethral stricture and evaluation of the bladder.  He has had on and off gross hematuria.  Also the cause for hydronephrosis seen on CT scan previously remains unknown at this time.  His main concern is urinary urgency and urge incontinence.  He does not seem to be on alfuzosin and Myrbetriq at this time.  Medications will be restarted.  Side effects discussed.  If he decides on further evaluation of gross hematuria and hydronephrosis, he will get back to me.\par \par Denzel Ramey MD, FACS\par [facility]\par  of Urology\par \par [address], [address]\[place] [address]\par \par [address], [address]\[place] [address]\par \par Tel: [phone]\par Fax: [phone]

## 2023-05-31 NOTE — LETTER BODY
[Dear  ___] : Dear  [unfilled], [Consult Letter:] : I had the pleasure of evaluating your patient, [unfilled]. [Consult Closing:] : Thank you very much for allowing me to participate in the care of this patient.  If you have any questions, please do not hesitate to contact me. [FreeTextEntry1] : ACP\par [address] \[place], [address]  \par [phone] \par

## 2023-05-31 NOTE — PHYSICAL EXAM
[General Appearance - Well Developed] : well developed [General Appearance - Well Nourished] : well nourished [Normal Appearance] : normal appearance [Well Groomed] : well groomed [General Appearance - In No Acute Distress] : no acute distress [Abdomen Soft] : soft [Abdomen Tenderness] : non-tender [Costovertebral Angle Tenderness] : no ~M costovertebral angle tenderness [] : no respiratory distress [Respiration, Rhythm And Depth] : normal respiratory rhythm and effort [Exaggerated Use Of Accessory Muscles For Inspiration] : no accessory muscle use [FreeTextEntry1] : Using the walker

## 2023-08-04 ENCOUNTER — APPOINTMENT (OUTPATIENT)
Dept: UROLOGY | Facility: CLINIC | Age: 88
End: 2023-08-04
Payer: MEDICARE

## 2023-08-04 VITALS
SYSTOLIC BLOOD PRESSURE: 146 MMHG | RESPIRATION RATE: 16 BRPM | OXYGEN SATURATION: 98 % | HEART RATE: 80 BPM | TEMPERATURE: 97 F | DIASTOLIC BLOOD PRESSURE: 88 MMHG

## 2023-08-04 DIAGNOSIS — R39.15 URGENCY OF URINATION: ICD-10-CM

## 2023-08-04 DIAGNOSIS — Z85.46 PERSONAL HISTORY OF MALIGNANT NEOPLASM OF PROSTATE: ICD-10-CM

## 2023-08-04 DIAGNOSIS — R31.0 GROSS HEMATURIA: ICD-10-CM

## 2023-08-04 DIAGNOSIS — N39.0 URINARY TRACT INFECTION, SITE NOT SPECIFIED: ICD-10-CM

## 2023-08-04 PROCEDURE — 99213 OFFICE O/P EST LOW 20 MIN: CPT

## 2023-08-04 NOTE — PHYSICAL EXAM
[General Appearance - Well Developed] : well developed [Abdomen Soft] : soft [Abdomen Tenderness] : non-tender [Costovertebral Angle Tenderness] : no ~M costovertebral angle tenderness [FreeTextEntry1] : Bladder not distended, penile prosthesis unchanged [] : no respiratory distress [Respiration, Rhythm And Depth] : normal respiratory rhythm and effort [Exaggerated Use Of Accessory Muscles For Inspiration] : no accessory muscle use

## 2023-08-04 NOTE — HISTORY OF PRESENT ILLNESS
[FreeTextEntry1] : He is an 87 year-old man who is seen today for persistent bacteriuria, urge incontinence and prostate cancer.  He restarted alfuzosin and Myrbetriq a few months ago but has only noticed a slight difference.  He continues to have urge incontinence and uses several pads a day.  Residual urine volume today was about 80 mL.  He was unable to give us a urine sample today.  Intermittently, he has noticed blood in his urine.  He was hospitalized around 2021 for gross hematuria and was told to have a urinary tract infection.  Previously urine cytology was negative and culture showed Proteus.  Cystoscopy was attempted in the office but due to tight urethral stricture the bladder could not be evaluated.  He declined to proceed with urethral stricture dilation under anesthesia.  PSA level was undetectable in 2021.   Addendum: Review of his records from the hospital showed that in October 2021 he was admitted for dysuria, hematuria and chest pain.  White blood cell count was 3.5 and hematocrit 32.  Creatinine was 1.0.  Urinalysis showed Proteus and Corynebacterium species.  He was treated with antibiotics.  CT scan with IV contrast showed mild to moderate bilateral hydronephrosis, right 1 cm renal cyst and thickened bladder wall.  In 2020, ultrasound showed normal kidneys.  Previous notes: Flomax and Detrol LA did not change urge incontinence.  He has difficulty making it to the bathroom on time for a long time. He uses a walker. Also he has tried long-term Macrobid. Previously he had ESBL E coli in urine. He uses a hernia belt.   Patient states that his cardiologist gave him testosterone injections for low testosterone in the past. He was treated with radiation therapy for prostate cancer more than 10 years ago. Subsequently he had placement of a semirigid penile prosthesis. He has tried biofeedback and anticholinergic medications without success.

## 2023-08-04 NOTE — ASSESSMENT
[FreeTextEntry1] : His examination is unchanged.  Restarting his medication did not change his urinary symptoms significantly.  He may or may not decide to continue taking it.  He continues to have urge incontinence.  He has declined to proceed with any surgical management of his stricture and assessment for gross hematuria.  If possible he could try pelvic floor physical therapy or biofeedback therapy to see if symptoms improve.  A referral was given.  At this time he is not interested in further assessment for prostate cancer or cause of hydronephrosis.  Denzel Ramey MD, FACS [facility]  of Urology  [address], [address] [place], [place] [id number] [place] [address], [address] [place] [address]  Tel: [phone] Fax: [phone]

## 2023-08-04 NOTE — LETTER BODY
[Dear  ___] : Dear  [unfilled], [Consult Letter:] : I had the pleasure of evaluating your patient, [unfilled]. [Consult Closing:] : Thank you very much for allowing me to participate in the care of this patient.  If you have any questions, please do not hesitate to contact me. [FreeTextEntry1] : ACP\par  [address] \[place]  [place], [place], [address]  \par  [phone] \par